# Patient Record
Sex: FEMALE | Race: WHITE | NOT HISPANIC OR LATINO | Employment: STUDENT | URBAN - METROPOLITAN AREA
[De-identification: names, ages, dates, MRNs, and addresses within clinical notes are randomized per-mention and may not be internally consistent; named-entity substitution may affect disease eponyms.]

---

## 2023-06-23 RX ORDER — FLUOXETINE HYDROCHLORIDE 40 MG/1
CAPSULE ORAL
Qty: 30 CAPSULE | Refills: 0 | OUTPATIENT
Start: 2023-06-23

## 2023-10-19 DIAGNOSIS — F32.A ANXIETY AND DEPRESSION: Primary | ICD-10-CM

## 2023-10-19 DIAGNOSIS — F41.9 ANXIETY AND DEPRESSION: Primary | ICD-10-CM

## 2023-10-20 RX ORDER — FLUOXETINE HYDROCHLORIDE 40 MG/1
40 CAPSULE ORAL DAILY
Qty: 30 CAPSULE | Refills: 0 | Status: SHIPPED | OUTPATIENT
Start: 2023-10-20 | End: 2023-12-08

## 2023-10-20 NOTE — TELEPHONE ENCOUNTER
I will send in 30 day refill.  She needs to schedule a telehealth visit (or in person if home) if she is going to request further refills from me.

## 2023-12-07 DIAGNOSIS — F32.A ANXIETY AND DEPRESSION: ICD-10-CM

## 2023-12-07 DIAGNOSIS — F41.9 ANXIETY AND DEPRESSION: ICD-10-CM

## 2023-12-08 DIAGNOSIS — F41.9 ANXIETY AND DEPRESSION: ICD-10-CM

## 2023-12-08 DIAGNOSIS — F32.A ANXIETY AND DEPRESSION: ICD-10-CM

## 2023-12-08 RX ORDER — FLUOXETINE HYDROCHLORIDE 40 MG/1
40 CAPSULE ORAL DAILY
Qty: 30 CAPSULE | Refills: 0 | Status: SHIPPED | OUTPATIENT
Start: 2023-12-08 | End: 2024-01-03 | Stop reason: SDUPTHER

## 2023-12-11 RX ORDER — FLUOXETINE HYDROCHLORIDE 40 MG/1
40 CAPSULE ORAL DAILY
Qty: 30 CAPSULE | Refills: 0 | OUTPATIENT
Start: 2023-12-11

## 2024-01-03 ENCOUNTER — OFFICE VISIT (OUTPATIENT)
Dept: PEDIATRICS | Facility: CLINIC | Age: 24
End: 2024-01-03
Payer: COMMERCIAL

## 2024-01-03 ENCOUNTER — LAB (OUTPATIENT)
Dept: LAB | Facility: LAB | Age: 24
End: 2024-01-03
Payer: COMMERCIAL

## 2024-01-03 VITALS
BODY MASS INDEX: 32.15 KG/M2 | WEIGHT: 193 LBS | DIASTOLIC BLOOD PRESSURE: 72 MMHG | SYSTOLIC BLOOD PRESSURE: 122 MMHG | HEIGHT: 65 IN | HEART RATE: 78 BPM

## 2024-01-03 DIAGNOSIS — Z00.00 ENCOUNTER FOR WELLNESS EXAMINATION IN ADULT: ICD-10-CM

## 2024-01-03 DIAGNOSIS — R53.83 OTHER FATIGUE: ICD-10-CM

## 2024-01-03 DIAGNOSIS — Z00.00 ENCOUNTER FOR WELLNESS EXAMINATION IN ADULT: Primary | ICD-10-CM

## 2024-01-03 DIAGNOSIS — K51.20 ULCERATIVE PROCTITIS WITHOUT COMPLICATION (MULTI): ICD-10-CM

## 2024-01-03 DIAGNOSIS — F41.9 ANXIETY AND DEPRESSION: ICD-10-CM

## 2024-01-03 DIAGNOSIS — F32.A ANXIETY AND DEPRESSION: ICD-10-CM

## 2024-01-03 DIAGNOSIS — E55.9 VITAMIN D DEFICIENCY: ICD-10-CM

## 2024-01-03 PROBLEM — L21.9 SEBORRHEIC DERMATITIS, UNSPECIFIED: Status: RESOLVED | Noted: 2023-03-13 | Resolved: 2024-01-03

## 2024-01-03 PROBLEM — L23.7 POISON IVY: Status: RESOLVED | Noted: 2024-01-03 | Resolved: 2024-01-03

## 2024-01-03 PROBLEM — H01.111 ALLERGIC DERMATITIS OF RIGHT UPPER EYELID: Status: ACTIVE | Noted: 2024-01-03

## 2024-01-03 PROBLEM — G44.209 TENSION HEADACHE: Status: ACTIVE | Noted: 2024-01-03

## 2024-01-03 PROBLEM — N92.6 IRREGULAR MENSTRUAL CYCLE: Status: RESOLVED | Noted: 2024-01-03 | Resolved: 2024-01-03

## 2024-01-03 PROBLEM — L21.8 OTHER SEBORRHEIC DERMATITIS: Status: RESOLVED | Noted: 2023-03-13 | Resolved: 2024-01-03

## 2024-01-03 PROBLEM — M26.629 TMJ PAIN DYSFUNCTION SYNDROME: Status: ACTIVE | Noted: 2024-01-03

## 2024-01-03 PROCEDURE — 85652 RBC SED RATE AUTOMATED: CPT

## 2024-01-03 PROCEDURE — 84443 ASSAY THYROID STIM HORMONE: CPT

## 2024-01-03 PROCEDURE — 83540 ASSAY OF IRON: CPT

## 2024-01-03 PROCEDURE — 83550 IRON BINDING TEST: CPT

## 2024-01-03 PROCEDURE — 82306 VITAMIN D 25 HYDROXY: CPT

## 2024-01-03 PROCEDURE — 90686 IIV4 VACC NO PRSV 0.5 ML IM: CPT | Performed by: PEDIATRICS

## 2024-01-03 PROCEDURE — 85025 COMPLETE CBC W/AUTO DIFF WBC: CPT

## 2024-01-03 PROCEDURE — 1036F TOBACCO NON-USER: CPT | Performed by: PEDIATRICS

## 2024-01-03 PROCEDURE — 99395 PREV VISIT EST AGE 18-39: CPT | Performed by: PEDIATRICS

## 2024-01-03 PROCEDURE — 90471 IMMUNIZATION ADMIN: CPT | Performed by: PEDIATRICS

## 2024-01-03 PROCEDURE — 36415 COLL VENOUS BLD VENIPUNCTURE: CPT

## 2024-01-03 PROCEDURE — 83718 ASSAY OF LIPOPROTEIN: CPT

## 2024-01-03 PROCEDURE — 82465 ASSAY BLD/SERUM CHOLESTEROL: CPT

## 2024-01-03 PROCEDURE — 80053 COMPREHEN METABOLIC PANEL: CPT

## 2024-01-03 RX ORDER — FLUOXETINE HYDROCHLORIDE 40 MG/1
40 CAPSULE ORAL DAILY
Qty: 90 CAPSULE | Refills: 1 | Status: SHIPPED | OUTPATIENT
Start: 2024-01-03 | End: 2024-07-01

## 2024-01-03 RX ORDER — FLUOXETINE HYDROCHLORIDE 40 MG/1
40 CAPSULE ORAL DAILY
Qty: 30 CAPSULE | Refills: 0 | Status: SHIPPED | OUTPATIENT
Start: 2024-01-03 | End: 2024-02-02

## 2024-01-03 RX ORDER — KETOCONAZOLE 20 MG/ML
SHAMPOO, SUSPENSION TOPICAL
COMMUNITY
Start: 2022-01-03

## 2024-01-03 RX ORDER — MESALAMINE 0.38 G/1
CAPSULE, EXTENDED RELEASE ORAL
COMMUNITY
End: 2024-01-16 | Stop reason: SDUPTHER

## 2024-01-03 RX ORDER — SELENIUM SULFIDE 2.5 MG/100ML
LOTION TOPICAL
COMMUNITY
Start: 2017-09-28 | End: 2024-03-13 | Stop reason: ALTCHOICE

## 2024-01-03 ASSESSMENT — VISUAL ACUITY
OS_CC: 20/20
OD_CC: 20/20

## 2024-01-03 NOTE — PROGRESS NOTES
Subjective   July is a 23 y.o. female who presents today for her Health Maintenance and Supervision Exam.    General Health:  July has concerns today about ongoing fatigue which makes self care more difficulty.  She struggles with healthy eating and regular sleep.  She tends to have insomnia and teeth grinding at night which contributes to tension headaches.  She has difficulty with motivation, but is also a perfectionist.  She has been seeing a therapist at college.  No thoughts of suicide.           Social and Family History:  At home, there have been no interval changes.  Living situation: lives independently on campus with other roommates.      Nutrition:  Balanced diet? Some of the time.  She sometimes doesn't have enough energy for meal prep.      Uses nutritional supplements? No    Dental Care:  July has a dental home? No--discussed finding a dentist to get fitted with a bite guard for TMJ.   Dental hygiene regularly performed? Yes    Elimination:  Elimination patterns appropriate: Occasional constipation or diarrhea.  She follows with GI for ulcerative proctitis.     Sleep:  Hours of sleep per night:  6 to 8 hrs  Sleep problems: Yes--insomnia  Snoring?  yes    Behavior/Socialization:  Good relationships with parents and siblings? Yes  Supportive adult relationship? Yes  Normal peer relationships? Yes       Development/Education:  July attends college in [place].     Academically well adjusted? Yes      Activities:    Physical Activity: Not regularly      Sexual History:  Dating? Yes.  Partner is non-binary    Menstrual History:  No menstrual periods since she has an IUD.     Drugs:  Tobacco? No  Uses drugs? alcohol--socially, but no binge drinking.     Mental Health:  Thoughts of self harm/suicide? No  She had not been taking medication regularly, but her mother has been motivating her to take it more consistently.           Objective   Physical Exam  Vitals reviewed.   Constitutional:       Appearance:  Normal appearance.   HENT:      Head: Normocephalic.      Right Ear: Tympanic membrane normal.      Left Ear: Tympanic membrane normal.      Nose: Nose normal.      Mouth/Throat:      Mouth: Mucous membranes are moist.      Pharynx: Oropharynx is clear.   Eyes:      Extraocular Movements: Extraocular movements intact.      Conjunctiva/sclera: Conjunctivae normal.      Pupils: Pupils are equal, round, and reactive to light.   Cardiovascular:      Rate and Rhythm: Normal rate and regular rhythm.      Heart sounds: No murmur heard.  Pulmonary:      Effort: Pulmonary effort is normal.      Breath sounds: Normal breath sounds.   Abdominal:      General: Abdomen is flat.      Palpations: Abdomen is soft.   Musculoskeletal:         General: Normal range of motion.      Cervical back: Normal range of motion and neck supple.   Skin:     General: Skin is warm and dry.   Neurological:      General: No focal deficit present.      Mental Status: She is alert.   Psychiatric:         Mood and Affect: Mood normal.         Assessment/Plan   Healthy 23 y.o. female child.  1. Anticipatory guidance discussed.  Safety topics reviewed.  2. Diagnoses and all orders for this visit:  Encounter for wellness examination in adult  -     Lipid Panel Non-Fasting; Future  Other fatigue  -     CBC and Auto Differential; Future  -     Sedimentation Rate; Future  -     Lipid Panel Non-Fasting; Future  -     TSH with reflex to Free T4 if abnormal; Future  -     Vitamin D 25-Hydroxy,Total (for eval of Vitamin D levels); Future  -     Comprehensive Metabolic Panel; Future  -     Iron and TIBC; Future  Ulcerative proctitis without complication (CMS/HCC)  -     CBC and Auto Differential; Future  -     Sedimentation Rate; Future  -     Vitamin D 25-Hydroxy,Total (for eval of Vitamin D levels); Future  -     Comprehensive Metabolic Panel; Future  -     Iron and TIBC; Future  Vitamin D deficiency  -     Vitamin D 25-Hydroxy,Total (for eval of Vitamin D  levels); Future  Anxiety and depression  -     FLUoxetine (PROzac) 40 mg capsule; Take 1 capsule (40 mg) by mouth once daily.  -     FLUoxetine (PROzac) 40 mg capsule; Take 1 capsule (40 mg) by mouth once daily.  Other orders  -     Flu vaccine (IIV4) age 6 months and greater, preservative free  -     Follow Up In Pediatrics; Future     3. Follow-up visit in 6 months for a medication check.

## 2024-01-03 NOTE — PATIENT INSTRUCTIONS
Here are some suggestions for living a healthy lifestyle and improving both your physical and mental health:     Try to eat at least 5 servings a day of fresh fruits and vegetables and limit processed and high sugar foods.      Get some exercise, or go for a walk for at least 30 minutes a day.  Spend less than 2 hours a day on a screen.  This doesn't count school work.  Make a list of activities that you enjoy that don't involve a screen such as reading, talking to friends, drawing, sports, crafts, etc.  Get at least 7-9 hours of regular sleep per night, and turn off electronics at least 30-60 minutes before bed.    Try new things, but remember you won't always succeed.  Talk about things that didn't go well and what you learned from the experience.  Remember, humans aren't perfect, so don't be hard on yourself--just keep trying and learning.  Try to make someone else smile every day.

## 2024-01-04 LAB
25(OH)D3 SERPL-MCNC: 27 NG/ML (ref 30–100)
ALBUMIN SERPL BCP-MCNC: 4.8 G/DL (ref 3.4–5)
ALP SERPL-CCNC: 98 U/L (ref 33–110)
ALT SERPL W P-5'-P-CCNC: 12 U/L (ref 7–45)
ANION GAP SERPL CALC-SCNC: 16 MMOL/L (ref 10–20)
AST SERPL W P-5'-P-CCNC: 14 U/L (ref 9–39)
BASOPHILS # BLD AUTO: 0.08 X10*3/UL (ref 0–0.1)
BASOPHILS NFR BLD AUTO: 1 %
BILIRUB SERPL-MCNC: 0.4 MG/DL (ref 0–1.2)
BUN SERPL-MCNC: 13 MG/DL (ref 6–23)
CALCIUM SERPL-MCNC: 10.1 MG/DL (ref 8.6–10.6)
CHLORIDE SERPL-SCNC: 103 MMOL/L (ref 98–107)
CHOLEST SERPL-MCNC: 175 MG/DL (ref 0–199)
CHOLESTEROL/HDL RATIO: 3.9
CO2 SERPL-SCNC: 24 MMOL/L (ref 21–32)
CREAT SERPL-MCNC: 0.72 MG/DL (ref 0.5–1.05)
EOSINOPHIL # BLD AUTO: 0.1 X10*3/UL (ref 0–0.7)
EOSINOPHIL NFR BLD AUTO: 1.2 %
ERYTHROCYTE [DISTWIDTH] IN BLOOD BY AUTOMATED COUNT: 14.6 % (ref 11.5–14.5)
ERYTHROCYTE [SEDIMENTATION RATE] IN BLOOD BY WESTERGREN METHOD: 10 MM/H (ref 0–20)
GFR SERPL CREATININE-BSD FRML MDRD: >90 ML/MIN/1.73M*2
GLUCOSE SERPL-MCNC: 111 MG/DL (ref 74–99)
HCT VFR BLD AUTO: 42.4 % (ref 36–46)
HDLC SERPL-MCNC: 45.2 MG/DL
HGB BLD-MCNC: 13.5 G/DL (ref 12–16)
IMM GRANULOCYTES # BLD AUTO: 0.03 X10*3/UL (ref 0–0.7)
IMM GRANULOCYTES NFR BLD AUTO: 0.4 % (ref 0–0.9)
IRON SATN MFR SERPL: 21 % (ref 25–45)
IRON SERPL-MCNC: 92 UG/DL (ref 35–150)
LYMPHOCYTES # BLD AUTO: 1.89 X10*3/UL (ref 1.2–4.8)
LYMPHOCYTES NFR BLD AUTO: 22.9 %
MCH RBC QN AUTO: 25.9 PG (ref 26–34)
MCHC RBC AUTO-ENTMCNC: 31.8 G/DL (ref 32–36)
MCV RBC AUTO: 81 FL (ref 80–100)
MONOCYTES # BLD AUTO: 0.64 X10*3/UL (ref 0.1–1)
MONOCYTES NFR BLD AUTO: 7.8 %
NEUTROPHILS # BLD AUTO: 5.5 X10*3/UL (ref 1.2–7.7)
NEUTROPHILS NFR BLD AUTO: 66.7 %
NON-HDL CHOLESTEROL: 130 MG/DL (ref 0–149)
NRBC BLD-RTO: 0 /100 WBCS (ref 0–0)
PLATELET # BLD AUTO: 375 X10*3/UL (ref 150–450)
POTASSIUM SERPL-SCNC: 3.9 MMOL/L (ref 3.5–5.3)
PROT SERPL-MCNC: 7.4 G/DL (ref 6.4–8.2)
RBC # BLD AUTO: 5.21 X10*6/UL (ref 4–5.2)
SODIUM SERPL-SCNC: 139 MMOL/L (ref 136–145)
TIBC SERPL-MCNC: 438 UG/DL (ref 240–445)
TSH SERPL-ACNC: 3.77 MIU/L (ref 0.44–3.98)
UIBC SERPL-MCNC: 346 UG/DL (ref 110–370)
WBC # BLD AUTO: 8.2 X10*3/UL (ref 4.4–11.3)

## 2024-01-16 DIAGNOSIS — K51.311 ULCERATIVE RECTOSIGMOIDITIS WITH RECTAL BLEEDING (MULTI): Primary | ICD-10-CM

## 2024-01-16 RX ORDER — MESALAMINE 0.38 G/1
375 CAPSULE, EXTENDED RELEASE ORAL 4 TIMES DAILY
Qty: 360 CAPSULE | Refills: 3 | Status: SHIPPED | OUTPATIENT
Start: 2024-01-16 | End: 2025-01-15

## 2024-03-13 ENCOUNTER — OFFICE VISIT (OUTPATIENT)
Dept: DERMATOLOGY | Facility: CLINIC | Age: 24
End: 2024-03-13
Payer: COMMERCIAL

## 2024-03-13 DIAGNOSIS — D22.9 BENIGN NEVUS: ICD-10-CM

## 2024-03-13 DIAGNOSIS — L21.9 SEBORRHEIC DERMATITIS: Primary | ICD-10-CM

## 2024-03-13 DIAGNOSIS — L70.0 ACNE VULGARIS: ICD-10-CM

## 2024-03-13 PROCEDURE — 99214 OFFICE O/P EST MOD 30 MIN: CPT | Performed by: DERMATOLOGY

## 2024-03-13 PROCEDURE — 1036F TOBACCO NON-USER: CPT | Performed by: DERMATOLOGY

## 2024-03-13 RX ORDER — TRETINOIN 0.5 MG/G
CREAM TOPICAL
Qty: 45 G | Refills: 1 | Status: SHIPPED | OUTPATIENT
Start: 2024-03-13

## 2024-03-13 RX ORDER — KETOCONAZOLE 20 MG/ML
SHAMPOO, SUSPENSION TOPICAL
Qty: 120 ML | Refills: 11 | Status: SHIPPED | OUTPATIENT
Start: 2024-03-13

## 2024-03-13 NOTE — PROGRESS NOTES
Subjective     July Harris is a 23 y.o. female who presents for the following: Seborrheic Dermatitis (Pt is using ketoconazole shampoo with good response, requesting refills. ), Acne (Face- pt states has only used otc treatments. ), and Suspicious Skin Lesion (Left shoulder lesion ).     Review of Systems:  No other skin or systemic complaints other than what is documented elsewhere in the note.    The following portions of the chart were reviewed this encounter and updated as appropriate:   Tobacco  Allergies  Meds  Problems  Med Hx  Surg Hx  Fam Hx         Skin Cancer History  No skin cancer on file.      Specialty Problems    None       Objective   Well appearing patient in no apparent distress; mood and affect are within normal limits.    A focused skin examination was performed. All findings within normal limits unless otherwise noted below.    Assessment/Plan   1. Seborrheic dermatitis  Scalp  Erythematous macule(s)/patch(es) with greasy scale    -Recommend to continue ketoconazole shampoo; refills sent today  -Topical clobetasol solution ineffective for the patient    ketoconazole (NIZOral) 2 % shampoo - Scalp  Lather on to affected area. Let sit for 2 minutes. Rinse thoroughly. Use 4 times per week.    2. Acne vulgaris  Head - Anterior (Face)  Open/closed comedones    -Discussed the nature of the condition  -Discussed treatment options  -Recommend to begin topical retinoid tretinoin 0.05%; if just starting therapy with retinoid, start application of a very thin layer three nights per week as tolerated. If extreme redness or dryness occurs, hold medication until resolved and then restart at a greater interval. May apply moisturizer after application of retinoid. Advance toward nightly use as tolerated  -Recommend:    tretinoin (Retin-A) 0.05 % cream - Head - Anterior (Face)  Apply a thin layer to affected areas at bedtime as tolerated.    3. Benign nevus  Left Shoulder - Posterior  Uniform pigmented  macule(s)/papule(s) with reassuring findings on dermoscopy    -Discussed nature of condition  -Reassurance, benign-appearing features on examination today  -Recommend continued observation        Follow up in 4 month for acne  Discussed if there are any changes or development of concerning symptoms (lesion/skin condition is changing, bleeding, enlarging, or worsening) the patient is to contact my office. The patient verbalizes understanding.    Clau Echols MD  3/13/2024    Addendum: Received communication from [employer]ito that Zoryve is approved.    Clau Echols MD  12/10/24

## 2024-06-18 DIAGNOSIS — K51.311 ULCERATIVE RECTOSIGMOIDITIS WITH RECTAL BLEEDING (MULTI): ICD-10-CM

## 2024-06-18 RX ORDER — MESALAMINE 0.38 G/1
375 CAPSULE, EXTENDED RELEASE ORAL 4 TIMES DAILY
Qty: 360 CAPSULE | Refills: 3 | Status: SHIPPED | OUTPATIENT
Start: 2024-06-18 | End: 2025-06-18

## 2024-07-15 ENCOUNTER — APPOINTMENT (OUTPATIENT)
Dept: DERMATOLOGY | Facility: CLINIC | Age: 24
End: 2024-07-15
Payer: COMMERCIAL

## 2024-07-24 DIAGNOSIS — F41.9 ANXIETY AND DEPRESSION: ICD-10-CM

## 2024-07-24 DIAGNOSIS — F32.A ANXIETY AND DEPRESSION: ICD-10-CM

## 2024-07-24 RX ORDER — FLUOXETINE HYDROCHLORIDE 40 MG/1
40 CAPSULE ORAL DAILY
Qty: 90 CAPSULE | Refills: 1 | Status: SHIPPED | OUTPATIENT
Start: 2024-07-24 | End: 2024-07-26 | Stop reason: WASHOUT

## 2024-07-26 ENCOUNTER — APPOINTMENT (OUTPATIENT)
Dept: PEDIATRICS | Facility: CLINIC | Age: 24
End: 2024-07-26
Payer: COMMERCIAL

## 2024-07-26 DIAGNOSIS — F32.A ANXIETY AND DEPRESSION: ICD-10-CM

## 2024-07-26 DIAGNOSIS — F41.9 ANXIETY AND DEPRESSION: ICD-10-CM

## 2024-07-26 PROCEDURE — 99213 OFFICE O/P EST LOW 20 MIN: CPT | Performed by: PEDIATRICS

## 2024-07-26 RX ORDER — FLUOXETINE HYDROCHLORIDE 40 MG/1
40 CAPSULE ORAL DAILY
Qty: 90 CAPSULE | Refills: 1 | Status: SHIPPED | OUTPATIENT
Start: 2024-10-14

## 2024-07-26 RX ORDER — FLUOXETINE HYDROCHLORIDE 40 MG/1
40 CAPSULE ORAL DAILY
Qty: 90 CAPSULE | Refills: 0 | Status: SHIPPED | OUTPATIENT
Start: 2024-07-26

## 2024-07-26 NOTE — PROGRESS NOTES
Subjective   July Harris is a 24 y.o. adult who presents for follow up of anxiety disorder.   This visit was done virtually.      Since the last visit:  She has been doing well and continues to see a therapist.  She feels that her mood has been more stable.  She still has some fatigue, but is in a better routine, and has been managing self care better.      July complains of the following side effects from the treatment: none.     Headache:  Yes --usually due to increased tension and teeth clenching.   Abdominal pain:  No   Dizziness:  No   Appetite changes:  No   Sleep disturbance:  No     Panic attacks:  rarely  Motivation:  good  Mood:  stable  Activities/friendships:  supportive  Relationships/Dating:  she is in a stable relationship.   Alcohol use:  in moderation, socially.   Other drug use:  denies.           Symptoms are:  better  Current suicidal risk:  None Reported    Objective   There were no vitals taken for this visit.   Physical Exam  Constitutional:       General: July is not in acute distress.     Appearance: Normal appearance.   Neurological:      Mental Status: July is alert.   Psychiatric:         Mood and Affect: Mood normal.         Thought Content: Thought content normal.         Assessment/Plan   Anxiety Disorder - improving  Diagnoses and all orders for this visit:  Anxiety and depression  -     FLUoxetine (PROzac) 40 mg capsule; Take 1 capsule (40 mg) by mouth once daily.  -     FLUoxetine (PROzac) 40 mg capsule; Take 1 capsule (40 mg) by mouth once daily. Do not fill before October 14, 2024.  Continue the current dose of fluoxetine.  Continue counseling.  Follow up in 6 months.  Will discuss transition to adult care as she will be graduating from college after next semester.

## 2024-09-25 DIAGNOSIS — K51.311 ULCERATIVE RECTOSIGMOIDITIS WITH RECTAL BLEEDING (MULTI): ICD-10-CM

## 2024-09-25 RX ORDER — MESALAMINE 0.38 G/1
375 CAPSULE, EXTENDED RELEASE ORAL 4 TIMES DAILY
Qty: 360 CAPSULE | Refills: 3 | Status: SHIPPED | OUTPATIENT
Start: 2024-09-25 | End: 2025-09-25

## 2024-11-26 ENCOUNTER — APPOINTMENT (OUTPATIENT)
Dept: DERMATOLOGY | Facility: CLINIC | Age: 24
End: 2024-11-26
Payer: COMMERCIAL

## 2024-11-26 DIAGNOSIS — L70.0 ACNE VULGARIS: ICD-10-CM

## 2024-11-26 DIAGNOSIS — L21.9 SEBORRHEIC DERMATITIS: Primary | ICD-10-CM

## 2024-11-26 PROCEDURE — 1036F TOBACCO NON-USER: CPT | Performed by: DERMATOLOGY

## 2024-11-26 PROCEDURE — 99214 OFFICE O/P EST MOD 30 MIN: CPT | Performed by: DERMATOLOGY

## 2024-11-26 RX ORDER — ROFLUMILAST 3 MG/G
CREAM TOPICAL DAILY
Status: CANCELLED | OUTPATIENT
Start: 2024-11-26

## 2024-11-26 RX ORDER — CLOBETASOL PROPIONATE 0.5 MG/ML
SOLUTION TOPICAL
Qty: 50 ML | Refills: 0 | Status: CANCELLED | OUTPATIENT
Start: 2024-11-26

## 2024-11-26 RX ORDER — CICLOPIROX 1 G/100ML
SHAMPOO TOPICAL
Qty: 120 ML | Refills: 2 | Status: CANCELLED | OUTPATIENT
Start: 2024-11-26

## 2024-11-26 RX ORDER — FLUOCINOLONE ACETONIDE 0.11 MG/ML
OIL TOPICAL
Qty: 118.28 ML | Refills: 0 | Status: CANCELLED | OUTPATIENT
Start: 2024-11-26

## 2024-11-26 NOTE — PROGRESS NOTES
Subjective     July Harris is a 24 y.o. adult who presents for the following: Acne (Pt states using tretinoin 0.05% prn with good response. Pt states improving. ).     Review of Systems:  No other skin or systemic complaints other than what is documented elsewhere in the note.    The following portions of the chart were reviewed this encounter and updated as appropriate:   Tobacco  Allergies  Meds  Problems  Med Hx  Surg Hx  Fam Hx                  Objective   Well appearing patient in no apparent distress; mood and affect are within normal limits.    A focused skin examination was performed. All findings within normal limits unless otherwise noted below.    Assessment/Plan   1. Seborrheic dermatitis  Scalp  Erythematous macule(s)/patch(es) with greasy scale    Inadequately controlled  -Recommend to continue ketoconazole shampoo  -Topical clobetasol solution ineffective for the patient; topical dermasmooth scalp oil ineffective  -Start lidex solution to the scalp twice daily when active  -Trial Zoryve; rx sent to [facility] pharmacy today    Related Medications  ketoconazole (NIZOral) 2 % shampoo  Lather on to affected area. Let sit for 2 minutes. Rinse thoroughly. Use 4 times per week.    roflumilast 0.3 % cream  Apply 1 Application topically once daily.    2. Acne vulgaris  Head - Anterior (Face)  A few scattered comedones    Patient is satisfied with level of control using topical tretinoin 0.05% prn  -Recommend to continue  -Patient declines use of medications daily; gets mild breakout 1-2 times per month    Related Medications  tretinoin (Retin-A) 0.05 % cream  Apply a thin layer to affected areas at bedtime as tolerated.        Follow up in 3-4 months seb derm  Discussed if there are any changes or development of concerning symptoms (lesion/skin condition is changing, bleeding, enlarging, or worsening) the patient is to contact my office. The patient verbalizes understanding.    Clau Orozco  MD [facility]  11/26/2024

## 2025-02-27 DIAGNOSIS — K51.20 ULCERATIVE PROCTITIS WITHOUT COMPLICATION (MULTI): Primary | ICD-10-CM

## 2025-02-27 RX ORDER — MESALAMINE 1000 MG/1
1000 SUPPOSITORY RECTAL NIGHTLY
Qty: 30 SUPPOSITORY | Refills: 11 | Status: SHIPPED | OUTPATIENT
Start: 2025-02-27 | End: 2026-02-27

## 2025-03-12 ENCOUNTER — OFFICE VISIT (OUTPATIENT)
Dept: GASTROENTEROLOGY | Facility: CLINIC | Age: 25
End: 2025-03-12
Payer: COMMERCIAL

## 2025-03-12 VITALS
BODY MASS INDEX: 30.82 KG/M2 | HEIGHT: 65 IN | SYSTOLIC BLOOD PRESSURE: 97 MMHG | WEIGHT: 185 LBS | DIASTOLIC BLOOD PRESSURE: 65 MMHG | HEART RATE: 98 BPM | TEMPERATURE: 97.9 F

## 2025-03-12 DIAGNOSIS — K51.311 ULCERATIVE RECTOSIGMOIDITIS WITH RECTAL BLEEDING (MULTI): ICD-10-CM

## 2025-03-12 PROCEDURE — 3008F BODY MASS INDEX DOCD: CPT | Performed by: INTERNAL MEDICINE

## 2025-03-12 PROCEDURE — 99214 OFFICE O/P EST MOD 30 MIN: CPT | Performed by: INTERNAL MEDICINE

## 2025-03-12 PROCEDURE — 1036F TOBACCO NON-USER: CPT | Performed by: INTERNAL MEDICINE

## 2025-03-12 RX ORDER — MESALAMINE 0.38 G/1
1.5 CAPSULE, EXTENDED RELEASE ORAL 2 TIMES DAILY
Qty: 720 CAPSULE | Refills: 3 | Status: SHIPPED | OUTPATIENT
Start: 2025-03-12 | End: 2026-03-12

## 2025-03-12 RX ORDER — PREDNISONE 5 MG/1
TABLET ORAL
Qty: 252 TABLET | Refills: 0 | Status: SHIPPED | OUTPATIENT
Start: 2025-03-12 | End: 2025-05-07

## 2025-03-12 ASSESSMENT — PAIN SCALES - GENERAL: PAINLEVEL_OUTOF10: 2

## 2025-03-12 NOTE — PROGRESS NOTES
History Of Present Illness  July Harris is a 24 y.o. adult presenting with ulcerative colitis.    They were diagnosed in 2019 when she presented with rectal bleeding and was found on colonoscopy to have ulcerative proctitis.  This was confirmed on biopsy.  Random biopsies from the right and left colon were normal and the terminal ileum appeared normal.  At that time they were treated with oral mesalamine which they had been doing well on until November 2024.  At that time they began to experience more frequent stools up to 4 times daily with occasional nocturnal diarrhea.  There was blood both separate and mixed in with the stool as well as urgency and tenesmus.  There is associated abdominal cramping as well as low back pain.  Weight has been relatively stable.  They were started on topical mesalamine about 3 weeks ago with only modest improvement.  There is no recent blood work done.  There was no antecedent illness prior to the November flare.  There is a family history of ulcerative colitis in their mother.     Past Medical History  Past Medical History:   Diagnosis Date    Influenza due to other identified influenza virus with other respiratory manifestations 03/20/2017    Influenza B    Irregular menstrual cycle 01/03/2024    Local infection of the skin and subcutaneous tissue, unspecified 07/19/2016    Bacterial skin infection    Other conditions influencing health status 07/31/2019    History of chronic diarrhea    Other seborrheic dermatitis 03/13/2023    Personal history of other diseases of the digestive system 07/31/2019    History of constipation    Personal history of other diseases of the female genital tract 08/26/2015    History of dysmenorrhea    Personal history of other diseases of the respiratory system 05/08/2015    History of streptococcal pharyngitis    Personal history of other diseases of the respiratory system 11/02/2016    History of pharyngitis    Personal history of other diseases of  "the respiratory system 11/02/2016    History of streptococcal pharyngitis    Personal history of other specified conditions 03/20/2017    History of fever    Poison ivy 01/03/2024    Seborrheic dermatitis, unspecified 01/02/2019    Seborrheic dermatitis of scalp    Seborrheic dermatitis, unspecified 03/13/2023       Surgical History  No past surgical history on file.     Social History  July reports that July has never smoked. July has never used smokeless tobacco. July reports current alcohol use of about 3.0 standard drinks of alcohol per week. July reports that July does not use drugs.    Family History  No family history on file.     Allergies  Sulfa (sulfonamide antibiotics)    Last Recorded Vitals  BP 97/65   Pulse 98   Temp 36.6 °C (97.9 °F) (Temporal)   Ht 1.651 m (5' 5\")   Wt 83.9 kg (185 lb)   BMI 30.79 kg/m²        Physical Exam  Vitals reviewed.   Constitutional:       Appearance: Normal appearance.   Cardiovascular:      Rate and Rhythm: Normal rate and regular rhythm.   Pulmonary:      Effort: Pulmonary effort is normal.      Breath sounds: Normal breath sounds.   Abdominal:      General: Bowel sounds are normal.      Palpations: Abdomen is soft. There is no mass.      Tenderness: There is no abdominal tenderness.   Genitourinary:     Comments: Perianal sinus tract, nontender, nonbleeding  Neurological:      Mental Status: July is alert.           Labs  Lab Results   Component Value Date    GLUCOSE 111 (H) 01/03/2024    CALCIUM 10.1 01/03/2024     01/03/2024    K 3.9 01/03/2024    CO2 24 01/03/2024     01/03/2024    BUN 13 01/03/2024    CREATININE 0.72 01/03/2024     Lab Results   Component Value Date    WBC 8.2 01/03/2024    HGB 13.5 01/03/2024    HCT 42.4 01/03/2024    MCV 81 01/03/2024     01/03/2024     01/03/2024    K 3.9 01/03/2024     01/03/2024    CO2 24 01/03/2024    BUN 13 01/03/2024    CREATININE 0.72 01/03/2024    CALCIUM 10.1 01/03/2024    PROT " 7.4 01/03/2024    BILITOT 0.4 01/03/2024    ALKPHOS 98 01/03/2024    ALT 12 01/03/2024    AST 14 01/03/2024    GLUCOSE 111 (H) 01/03/2024    CRP 0.73 07/31/2019           Imaging     Patient was never admitted.  Colonoscopy  Patient Name: July Harris  Procedure Date: 8/28/2019 9:14 AM  MRN: [id number]  Account Number: [id number]  YOB: 2000  Site: [facility] Procedure Room 1  Ethnicity: Not  or   Race: White  Attending MD: Yakov Todd MD, [id number]  Procedure:             Colonoscopy  Indications:           Hematochezia, Family history of inflammatory bowel                          disease in a first-degree relative  Patient Profile:       This is a 19 year old female.  Providers:             Yakov Todd MD (Doctor) Gastroenterology  Referring MD:          Stephanie Farah  Medicines:             Demerol  mgs, Versed IV 5 mgs  Complications:         No immediate complications.  Procedure:             Pre-Anesthesia Assessment:                         - Prior to the procedure, a History and Physical was                          performed, and patient medications and allergies were                          reviewed. The patient is competent. The risks and                          benefits of the procedure and the sedation options and                          risks were discussed with the patient. All questions                          were answered and informed consent was obtained.                          Patient identification and proposed procedure were                          verified by the physician and the nurse in the                          pre-procedure area. Mental Status Examination: alert                          and oriented. Airway Examination: normal oropharyngeal                          airway and neck mobility. Respiratory Examination:                          clear to auscultation. CV Examination: normal.                          Prophylactic  Antibiotics: The patient does not require                          prophylactic antibiotics. Prior Anticoagulants: The                          patient has taken no previous anticoagulant or                          antiplatelet agents. ASA Grade Assessment: I - A                          normal, healthy patient. After reviewing the risks and                          benefits, the patient was deemed in satisfactory                          condition to undergo the procedure. The anesthesia                          plan was to use moderate sedation / analgesia                          (conscious sedation). Immediately prior to                          administration of medications, the patient was                          re-assessed for adequacy to receive sedatives. The                          heart rate, respiratory rate, oxygen saturations,                          blood pressure, adequacy of pulmonary ventilation, and                          response to care were monitored throughout the                          procedure. The physical status of the patient was                          re-assessed after the procedure.                         After I obtained informed consent, the scope was                          passed under direct vision. Throughout the procedure,                          the patient's blood pressure, pulse, and oxygen                          saturations were monitored continuously. The pediatric                          colonoscope was introduced through the anus and                          advanced to the terminal ileum, with identification of                          the appendiceal orifice and IC valve. The terminal                          ileum, ileocecal valve, appendiceal orifice, and                          rectum were photographed. The colonoscopy was                          performed without difficulty. The patient tolerated                          the procedure well. The  quality of the bowel                          preparation was evaluated using the BBPS (Wilbur Bowel                          Preparation Scale) with scores of: Right Colon = 3                          (entire mucosa seen well with no residual staining,                          small fragments of stool or opaque liquid), Transverse                          Colon = 3 (entire mucosa seen well with no residual                          staining, small fragments of stool or opaque liquid)                          and Left Colon = 3 (entire mucosa seen well with no                          residual staining, small fragments of stool or opaque                          liquid). The total BBPS score equals 9. The quality of                          the bowel preparation was excellent.  Findings:       The perianal and digital rectal examinations were normal.       A continuous area of nonbleeding granular, ulcerated mucosa was present        in the rectum. Biopsies were taken with a cold forceps for histology.       The exam was otherwise normal throughout the examined colon.       Biopsies were taken with a cold forceps in the sigmoid colon and in the        cecum for histology.       The terminal ileum appeared normal.  Moderate Sedation:       Moderate (conscious) sedation was administered by the endoscopy nurse        and supervised by the endoscopist. The following parameters were        monitored: oxygen saturation, heart rate, blood pressure, respiratory        rate, EKG, adequacy of pulmonary ventilation, and response to care.        Total physician intraservice time was 36 minutes.  Estimated Blood Loss:       Estimated blood loss: none.  Impression:            - Mucosal ulceration. Biopsied.                         - The examined portion of the ileum was normal.                         - Biopsies were taken with a cold forceps for                          histology in the sigmoid colon and in the  cecum.  Recommendation:        - Patient has a contact number available for                          emergencies. The signs and symptoms of potential                          delayed complications were discussed with the patient.                          Return to normal activities tomorrow. Written                          discharge instructions were provided to the patient.                         - Resume previous diet.                         - Continue present medications.                         - Await pathology results.                         - Repeat colonoscopy date to be determined after                          pending pathology results are reviewed for                          surveillance.                         - Return to GI office in 4 weeks.  Attending Participation:       I personally performed the entire procedure.  Yakov Todd MD  8/28/2019 10:00:26 AM  This report has been signed electronically.  Number of Addenda: 0  Note Initiated On: 8/28/2019 9:14 AM  Scope Withdrawal Time 0 hours 15 minutes 40 seconds   Total Procedure Duration Time 0 hours 27 minutes 43 seconds          ASSESSMENT & PLAN  Problem List Items Addressed This Visit    None  Visit Diagnoses         Codes    Ulcerative rectosigmoiditis with rectal bleeding (Multi)     K51.311    Relevant Medications    mesalamine ER (Apriso) 0.375 gram 24 hr capsule    predniSONE (Deltasone) 5 mg tablet    Other Relevant Orders    CBC    C-reactive protein    Comprehensive metabolic panel    Colonoscopy Screening; High Risk Patient          This is almost certainly a ulcerative colitis flare and at this point cannot tell whether the colitis has progressed proximally or not.  We will increase the mesalamine dose to 3 g daily and continue topical therapy.  We will give a prednisone taper starting at 40 mg daily.  Labs will be sent for CBC CRP and comprehensive panel.  Colonoscopy will be scheduled for later this spring after [facility]  graduation.    I spent 20 minutes in the professional and overall care of this patient.      Yakov Todd MD

## 2025-03-12 NOTE — PATIENT INSTRUCTIONS
We will increase the dose of oral mesalamine and give you a several week taper of prednisone. I will check blood work today and schedule for a colonoscopy in May to recheck the colitis. Let me know via My Chart if anything changes.

## 2025-03-13 LAB
ALBUMIN SERPL-MCNC: 4.5 G/DL (ref 3.6–5.1)
ALP SERPL-CCNC: 75 U/L (ref 31–125)
ALT SERPL-CCNC: 10 U/L (ref 6–29)
ANION GAP SERPL CALCULATED.4IONS-SCNC: 8 MMOL/L (CALC) (ref 7–17)
AST SERPL-CCNC: 11 U/L (ref 10–30)
BILIRUB SERPL-MCNC: 0.3 MG/DL (ref 0.2–1.2)
BUN SERPL-MCNC: 15 MG/DL (ref 7–25)
CALCIUM SERPL-MCNC: 9.5 MG/DL (ref 8.6–10.2)
CHLORIDE SERPL-SCNC: 107 MMOL/L (ref 98–110)
CO2 SERPL-SCNC: 28 MMOL/L (ref 20–32)
CREAT SERPL-MCNC: 0.88 MG/DL (ref 0.5–0.96)
CRP SERPL-MCNC: 9.1 MG/L
EGFRCR SERPLBLD CKD-EPI 2021: 95 ML/MIN/1.73M2
ERYTHROCYTE [DISTWIDTH] IN BLOOD BY AUTOMATED COUNT: 12.1 % (ref 11–15)
GLUCOSE SERPL-MCNC: 103 MG/DL (ref 65–139)
HCT VFR BLD AUTO: 36.4 % (ref 35–45)
HGB BLD-MCNC: 11.9 G/DL (ref 11.7–15.5)
MCH RBC QN AUTO: 29.5 PG (ref 27–33)
MCHC RBC AUTO-ENTMCNC: 32.7 G/DL (ref 32–36)
MCV RBC AUTO: 90.1 FL (ref 80–100)
PLATELET # BLD AUTO: 466 THOUSAND/UL (ref 140–400)
PMV BLD REES-ECKER: 9.7 FL (ref 7.5–12.5)
POTASSIUM SERPL-SCNC: 4.4 MMOL/L (ref 3.5–5.3)
PROT SERPL-MCNC: 7.1 G/DL (ref 6.1–8.1)
RBC # BLD AUTO: 4.04 MILLION/UL (ref 3.8–5.1)
SODIUM SERPL-SCNC: 143 MMOL/L (ref 135–146)
WBC # BLD AUTO: 13 THOUSAND/UL (ref 3.8–10.8)

## 2025-03-17 ENCOUNTER — APPOINTMENT (OUTPATIENT)
Dept: DERMATOLOGY | Facility: CLINIC | Age: 25
End: 2025-03-17
Payer: COMMERCIAL

## 2025-04-04 DIAGNOSIS — F41.9 ANXIETY AND DEPRESSION: ICD-10-CM

## 2025-04-04 DIAGNOSIS — F32.A ANXIETY AND DEPRESSION: ICD-10-CM

## 2025-04-07 RX ORDER — FLUOXETINE HYDROCHLORIDE 40 MG/1
40 CAPSULE ORAL DAILY
Qty: 90 CAPSULE | Refills: 1 | Status: SHIPPED | OUTPATIENT
Start: 2025-04-07

## 2025-04-29 DIAGNOSIS — K51.311 ULCERATIVE RECTOSIGMOIDITIS WITH RECTAL BLEEDING (MULTI): ICD-10-CM

## 2025-04-29 RX ORDER — PREDNISONE 5 MG/1
TABLET ORAL
Qty: 252 TABLET | Refills: 0 | Status: SHIPPED | OUTPATIENT
Start: 2025-04-29

## 2025-05-16 DIAGNOSIS — K51.311 ULCERATIVE RECTOSIGMOIDITIS WITH RECTAL BLEEDING (MULTI): Primary | ICD-10-CM

## 2025-05-16 RX ORDER — PREDNISONE 5 MG/1
5 TABLET ORAL 2 TIMES DAILY
Qty: 60 TABLET | Refills: 2 | Status: SHIPPED | OUTPATIENT
Start: 2025-05-16 | End: 2025-06-15

## 2025-05-23 ENCOUNTER — APPOINTMENT (OUTPATIENT)
Dept: PEDIATRICS | Facility: CLINIC | Age: 25
End: 2025-05-23
Payer: COMMERCIAL

## 2025-05-23 VITALS
WEIGHT: 195.8 LBS | SYSTOLIC BLOOD PRESSURE: 118 MMHG | HEART RATE: 72 BPM | DIASTOLIC BLOOD PRESSURE: 74 MMHG | BODY MASS INDEX: 32.62 KG/M2 | HEIGHT: 65 IN

## 2025-05-23 DIAGNOSIS — Z23 ENCOUNTER FOR IMMUNIZATION: ICD-10-CM

## 2025-05-23 DIAGNOSIS — F32.A ANXIETY AND DEPRESSION: ICD-10-CM

## 2025-05-23 DIAGNOSIS — F41.9 ANXIETY AND DEPRESSION: ICD-10-CM

## 2025-05-23 DIAGNOSIS — K51.219 CHRONIC ULCERATIVE PROCTITIS WITH COMPLICATION (MULTI): ICD-10-CM

## 2025-05-23 DIAGNOSIS — Z00.00 WELL EXAM WITHOUT ABNORMAL FINDINGS OF PATIENT 18 YEARS OF AGE OR OLDER: Primary | ICD-10-CM

## 2025-05-23 PROCEDURE — 90471 IMMUNIZATION ADMIN: CPT | Performed by: PEDIATRICS

## 2025-05-23 PROCEDURE — 99395 PREV VISIT EST AGE 18-39: CPT | Performed by: PEDIATRICS

## 2025-05-23 PROCEDURE — 99213 OFFICE O/P EST LOW 20 MIN: CPT | Performed by: PEDIATRICS

## 2025-05-23 PROCEDURE — 1036F TOBACCO NON-USER: CPT | Performed by: PEDIATRICS

## 2025-05-23 PROCEDURE — 3008F BODY MASS INDEX DOCD: CPT | Performed by: PEDIATRICS

## 2025-05-23 PROCEDURE — 90715 TDAP VACCINE 7 YRS/> IM: CPT | Performed by: PEDIATRICS

## 2025-05-23 PROCEDURE — 96127 BRIEF EMOTIONAL/BEHAV ASSMT: CPT | Performed by: PEDIATRICS

## 2025-05-23 RX ORDER — FLUOXETINE HYDROCHLORIDE 40 MG/1
40 CAPSULE ORAL DAILY
Qty: 90 CAPSULE | Refills: 1 | Status: SHIPPED | OUTPATIENT
Start: 2025-05-23

## 2025-05-23 ASSESSMENT — PATIENT HEALTH QUESTIONNAIRE - PHQ9
1. LITTLE INTEREST OR PLEASURE IN DOING THINGS: SEVERAL DAYS
10. IF YOU CHECKED OFF ANY PROBLEMS, HOW DIFFICULT HAVE THESE PROBLEMS MADE IT FOR YOU TO DO YOUR WORK, TAKE CARE OF THINGS AT HOME, OR GET ALONG WITH OTHER PEOPLE: EXTREMELY DIFFICULT
7. TROUBLE CONCENTRATING ON THINGS, SUCH AS READING THE NEWSPAPER OR WATCHING TELEVISION: SEVERAL DAYS
6. FEELING BAD ABOUT YOURSELF - OR THAT YOU ARE A FAILURE OR HAVE LET YOURSELF OR YOUR FAMILY DOWN: MORE THAN HALF THE DAYS
SUM OF ALL RESPONSES TO PHQ QUESTIONS 1-9: 13
8. MOVING OR SPEAKING SO SLOWLY THAT OTHER PEOPLE COULD HAVE NOTICED. OR THE OPPOSITE, BEING SO FIGETY OR RESTLESS THAT YOU HAVE BEEN MOVING AROUND A LOT MORE THAN USUAL: NOT AT ALL
2. FEELING DOWN, DEPRESSED OR HOPELESS: MORE THAN HALF THE DAYS
10. IF YOU CHECKED OFF ANY PROBLEMS, HOW DIFFICULT HAVE THESE PROBLEMS MADE IT FOR YOU TO DO YOUR WORK, TAKE CARE OF THINGS AT HOME, OR GET ALONG WITH OTHER PEOPLE: EXTREMELY DIFFICULT
1. LITTLE INTEREST OR PLEASURE IN DOING THINGS: SEVERAL DAYS
9. THOUGHTS THAT YOU WOULD BE BETTER OFF DEAD, OR OF HURTING YOURSELF: SEVERAL DAYS
SUM OF ALL RESPONSES TO PHQ9 QUESTIONS 1 & 2: 3
9. THOUGHTS THAT YOU WOULD BE BETTER OFF DEAD, OR OF HURTING YOURSELF: SEVERAL DAYS
4. FEELING TIRED OR HAVING LITTLE ENERGY: NEARLY EVERY DAY
6. FEELING BAD ABOUT YOURSELF - OR THAT YOU ARE A FAILURE OR HAVE LET YOURSELF OR YOUR FAMILY DOWN: MORE THAN HALF THE DAYS
3. TROUBLE FALLING OR STAYING ASLEEP: SEVERAL DAYS
5. POOR APPETITE OR OVEREATING: MORE THAN HALF THE DAYS
8. MOVING OR SPEAKING SO SLOWLY THAT OTHER PEOPLE COULD HAVE NOTICED. OR THE OPPOSITE - BEING SO FIDGETY OR RESTLESS THAT YOU HAVE BEEN MOVING AROUND A LOT MORE THAN USUAL: NOT AT ALL
2. FEELING DOWN, DEPRESSED OR HOPELESS: MORE THAN HALF THE DAYS
3. TROUBLE FALLING OR STAYING ASLEEP OR SLEEPING TOO MUCH: SEVERAL DAYS
5. POOR APPETITE OR OVEREATING: MORE THAN HALF THE DAYS
4. FEELING TIRED OR HAVING LITTLE ENERGY: NEARLY EVERY DAY
7. TROUBLE CONCENTRATING ON THINGS, SUCH AS READING THE NEWSPAPER OR WATCHING TELEVISION: SEVERAL DAYS

## 2025-05-23 ASSESSMENT — ANXIETY QUESTIONNAIRES
4. TROUBLE RELAXING: SEVERAL DAYS
2. NOT BEING ABLE TO STOP OR CONTROL WORRYING: SEVERAL DAYS
5. BEING SO RESTLESS THAT IT IS HARD TO SIT STILL: NOT AT ALL
1. FEELING NERVOUS, ANXIOUS, OR ON EDGE: MORE THAN HALF THE DAYS
6. BECOMING EASILY ANNOYED OR IRRITABLE: NEARLY EVERY DAY
7. FEELING AFRAID AS IF SOMETHING AWFUL MIGHT HAPPEN: SEVERAL DAYS
2. NOT BEING ABLE TO STOP OR CONTROL WORRYING: SEVERAL DAYS
3. WORRYING TOO MUCH ABOUT DIFFERENT THINGS: SEVERAL DAYS
GAD7 TOTAL SCORE: 9
7. FEELING AFRAID AS IF SOMETHING AWFUL MIGHT HAPPEN: SEVERAL DAYS
IF YOU CHECKED OFF ANY PROBLEMS ON THIS QUESTIONNAIRE, HOW DIFFICULT HAVE THESE PROBLEMS MADE IT FOR YOU TO DO YOUR WORK, TAKE CARE OF THINGS AT HOME, OR GET ALONG WITH OTHER PEOPLE: VERY DIFFICULT
6. BECOMING EASILY ANNOYED OR IRRITABLE: NEARLY EVERY DAY
5. BEING SO RESTLESS THAT IT IS HARD TO SIT STILL: NOT AT ALL
IF YOU CHECKED OFF ANY PROBLEMS ON THIS QUESTIONNAIRE, HOW DIFFICULT HAVE THESE PROBLEMS MADE IT FOR YOU TO DO YOUR WORK, TAKE CARE OF THINGS AT HOME, OR GET ALONG WITH OTHER PEOPLE: VERY DIFFICULT
4. TROUBLE RELAXING: SEVERAL DAYS
3. WORRYING TOO MUCH ABOUT DIFFERENT THINGS: SEVERAL DAYS
1. FEELING NERVOUS, ANXIOUS, OR ON EDGE: MORE THAN HALF THE DAYS

## 2025-05-23 NOTE — PATIENT INSTRUCTIONS
Follow-Up Instructions:  Consult with the gastroenterologist regarding potential dietary modifications and the possibility of meeting with a dietitian.  Continue weekly counseling sessions for depression.  Find a new optometrist for eye strain headaches.  Maintain a consistent sleep schedule.  Gradually incorporate healthy foods into the diet.  Engage in regular physical activity such as walking.  Wear a helmet while biking and avoid swimming alone.  Consider physical therapy if back pain persists.  Schedule a follow-up visit after the colonoscopy.

## 2025-05-23 NOTE — PROGRESS NOTES
Subjective   July is a 25 y.o. adult who presents today for  Health Maintenance and Supervision Exam.    General Health:    History of Present Illness  Patient presents for a routine visit.  She has remained on fluoxetine 40 mg daily for anxiety and depression.  She regularly sees a counselor, and although stress levels have been increased, she would like to remain on the current dose of fluoxetine since she recently graduated from college, and does not have the academic stressors.  She will be moving to [place] for work.      Nutrition/Diet: She attempts to include fruits and vegetables in her diet daily and has reduced her dairy intake. She consumes spinach regularly.    Sleep: Her sleep varies, averaging 6 hours per night, but can range from 3 to 4 hours on difficult nights to 12 hours on others. She admits to using sleep as a coping mechanism. She reports no snoring.    Dental Health: She maintains regular dental appointments.    Activities/Interests: She engages in walking for exercise.    Sexual Orientation/Gender Identity: She is in a stable relationship with her girlfriend.    Safety Practices: She always wears her seatbelt while driving.    Social Determinants of Health (SDOH): She consumes alcohol socially, typically a glass of wine, and avoids excessive drinking due to potential interactions with fluoxetine.    Vision & Hearing: She reports occasional eye strain headaches and needs to find a new optometrist as her previous one has relocated.    Gynecological History: No, she is not currently menstruating because she has an IUD.    July has a history of ulcerative colitis.  She is currently on a regimen of mesalamine, fluoxetine 40 mg, prednisone, and continues to use ketaconazole shampoo. Additionally, she takes vitamin D supplements, multivitamins, and iron supplements. She reports persistent lower abdominal and back pain. She has not identified any specific food triggers for her symptoms, although she  admits to inconsistent dietary habits. She has been avoiding eggs and has not attempted any elimination diets such as FODMAP. She recently consulted her gastroenterologist after a 3-year gap, and a colonoscopy is scheduled in a month. The plan is to continue prednisone until the colonoscopy. She was initially prescribed prednisone due to bleeding and pain, but a reduction in dosage exacerbated her back pain. A temporary increase in prednisone dosage provided some relief.    Her stress levels have decreased since completing her education, but she acknowledges that her stress and anxiety contribute to her depression. She reports no suicidal ideation or self-harm thoughts. She experienced a severe depressive episode 4 months ago, which improved with professional help. She continues to see a counselor weekly and prefers to maintain her fluoxetine dosage at 40 mg.  PHQ-9 score of 13  ASQ score of 9  She continues to experience teeth clenching and TMJ, but these symptoms have lessened. She finds relaxation techniques before bed helpful and does not wake up with these symptoms. She does not use any substances for relaxation.    She occasionally experiences wrist pain, which was particularly bothersome in her left wrist 1 to 2 weeks ago but has since resolved. She attributes this to prolonged computer use. She reports no finger tingling, ankle, knee, or hip pain. Her back pain is not consistently movement-related but can be triggered by bending over or standing up. The pain originates at the joint where her pelvis meets her spine and radiates upwards. She is not currently seeing a physical therapist.                Objective   Physical Exam  Vitals reviewed.   Constitutional:       Appearance: Normal appearance.   HENT:      Head: Normocephalic.      Right Ear: Tympanic membrane normal.      Left Ear: Tympanic membrane normal.      Nose: Nose normal.      Mouth/Throat:      Mouth: Mucous membranes are moist.      Pharynx:  Oropharynx is clear.   Eyes:      Extraocular Movements: Extraocular movements intact.      Conjunctiva/sclera: Conjunctivae normal.      Pupils: Pupils are equal, round, and reactive to light.   Cardiovascular:      Rate and Rhythm: Normal rate and regular rhythm.      Heart sounds: No murmur heard.  Pulmonary:      Effort: Pulmonary effort is normal.      Breath sounds: Normal breath sounds.   Abdominal:      General: Abdomen is flat.      Palpations: Abdomen is soft.   Musculoskeletal:         General: Normal range of motion.      Cervical back: Normal range of motion and neck supple.      Comments: Mild pain on palpation over SI joints bilaterally.    Skin:     General: Skin is warm and dry.   Neurological:      General: No focal deficit present.      Mental Status: July is alert.   Psychiatric:         Mood and Affect: Mood normal.         Assessment/Plan   Healthy 25 y.o. adult child.  1. Anticipatory guidance discussed.  Safety topics reviewed.  2. Diagnoses and all orders for this visit:  Well exam without abnormal findings of patient 18 years of age or older  Encounter for immunization  -     Tdap vaccine, age 7 years and older  Anxiety and depression  -     FLUoxetine (PROzac) 40 mg capsule; Take 1 capsule (40 mg) by mouth once daily.     3. Follow-up visit in 1 year for next well child visit, or sooner as needed.   Assessment & Plan  1. Abdominal pain.  The abdominal pain may be associated with underlying inflammation from UC in addition to  irritable bowel syndrome. She is currently taking mesalamine and prednisone. A colonoscopy is scheduled in a month. She will continue prednisone until the colonoscopy. She is advised to consult with her gastroenterologist regarding potential dietary modifications and the possibility of meeting with a dietitian.    2. Depression.  Her depression appears to be exacerbated by stress and anxiety. She is currently taking fluoxetine 40 mg daily. She reports no thoughts of  self-harm or suicide. She is encouraged to engage in enjoyable activities and maintain a positive outlook. She will continue her counseling sessions once a week. If her symptoms persist or worsen, an increase in fluoxetine dosage will be considered.    3. Headaches.  She experiences occasional eye strain headaches but no debilitating headaches. She needs to find a new optometrist as her previous one has moved.    4. Temporomandibular joint disorder.  She reports occasional teeth clenching and TMJ symptoms, which have improved with relaxation techniques before bed.    5. Sleep disturbances.  She reports variable sleep patterns, averaging 6 hours per night but sometimes sleeping as little as 3-4 hours or as much as 12 hours. She is advised to maintain a consistent sleep schedule and avoid using sleep to avoid daily activities.    6. Health maintenance.  She is advised to gradually incorporate healthy foods into her diet and engage in regular physical activity such as walking. Safety measures such as wearing a helmet while biking and avoiding swimming alone were discussed. A tetanus vaccine will be administered today.    7. Back pain.  The back pain may be related to inflammation in the joint where the pelvis meets the spine, potentially associated with her bowel condition. She is advised to consider physical therapy if the back pain persists.   This medical note was created with the assistance of artificial intelligence (AI) for documentation purposes. The content has been reviewed and confirmed by the healthcare provider for accuracy and completeness. Patient consented to the use of audio recording and use of AI during their visit.

## 2025-06-13 ENCOUNTER — HOSPITAL ENCOUNTER (OUTPATIENT)
Dept: GASTROENTEROLOGY | Facility: HOSPITAL | Age: 25
Discharge: HOME | End: 2025-06-13
Payer: COMMERCIAL

## 2025-06-13 ENCOUNTER — ANESTHESIA (OUTPATIENT)
Dept: GASTROENTEROLOGY | Facility: HOSPITAL | Age: 25
End: 2025-06-13
Payer: COMMERCIAL

## 2025-06-13 ENCOUNTER — ANESTHESIA EVENT (OUTPATIENT)
Dept: GASTROENTEROLOGY | Facility: HOSPITAL | Age: 25
End: 2025-06-13
Payer: COMMERCIAL

## 2025-06-13 VITALS
HEART RATE: 72 BPM | WEIGHT: 190 LBS | DIASTOLIC BLOOD PRESSURE: 64 MMHG | OXYGEN SATURATION: 97 % | SYSTOLIC BLOOD PRESSURE: 98 MMHG | BODY MASS INDEX: 32.44 KG/M2 | TEMPERATURE: 97.2 F | HEIGHT: 64 IN | RESPIRATION RATE: 16 BRPM

## 2025-06-13 DIAGNOSIS — K51.311 ULCERATIVE RECTOSIGMOIDITIS WITH RECTAL BLEEDING (MULTI): ICD-10-CM

## 2025-06-13 PROCEDURE — 2500000004 HC RX 250 GENERAL PHARMACY W/ HCPCS (ALT 636 FOR OP/ED)

## 2025-06-13 PROCEDURE — 3700000002 HC GENERAL ANESTHESIA TIME - EACH INCREMENTAL 1 MINUTE

## 2025-06-13 PROCEDURE — 7100000009 HC PHASE TWO TIME - INITIAL BASE CHARGE

## 2025-06-13 PROCEDURE — 7100000010 HC PHASE TWO TIME - EACH INCREMENTAL 1 MINUTE

## 2025-06-13 PROCEDURE — 45380 COLONOSCOPY AND BIOPSY: CPT | Performed by: INTERNAL MEDICINE

## 2025-06-13 PROCEDURE — 3700000001 HC GENERAL ANESTHESIA TIME - INITIAL BASE CHARGE

## 2025-06-13 RX ORDER — MIDAZOLAM HYDROCHLORIDE 1 MG/ML
INJECTION INTRAMUSCULAR; INTRAVENOUS AS NEEDED
Status: DISCONTINUED | OUTPATIENT
Start: 2025-06-13 | End: 2025-06-13

## 2025-06-13 RX ORDER — FENTANYL CITRATE 50 UG/ML
INJECTION, SOLUTION INTRAMUSCULAR; INTRAVENOUS AS NEEDED
Status: DISCONTINUED | OUTPATIENT
Start: 2025-06-13 | End: 2025-06-13

## 2025-06-13 RX ORDER — PROPOFOL 10 MG/ML
INJECTION, EMULSION INTRAVENOUS AS NEEDED
Status: DISCONTINUED | OUTPATIENT
Start: 2025-06-13 | End: 2025-06-13

## 2025-06-13 RX ORDER — ONDANSETRON HYDROCHLORIDE 2 MG/ML
4 INJECTION, SOLUTION INTRAVENOUS ONCE AS NEEDED
Status: DISCONTINUED | OUTPATIENT
Start: 2025-06-13 | End: 2025-06-14 | Stop reason: HOSPADM

## 2025-06-13 RX ORDER — PHENYLEPHRINE HYDROCHLORIDE 10 MG/ML
INJECTION INTRAVENOUS AS NEEDED
Status: DISCONTINUED | OUTPATIENT
Start: 2025-06-13 | End: 2025-06-13

## 2025-06-13 RX ADMIN — FENTANYL CITRATE 25 MCG: 50 INJECTION, SOLUTION INTRAMUSCULAR; INTRAVENOUS at 10:33

## 2025-06-13 RX ADMIN — PHENYLEPHRINE HYDROCHLORIDE 120 MCG: 10 INJECTION INTRAVENOUS at 10:45

## 2025-06-13 RX ADMIN — PHENYLEPHRINE HYDROCHLORIDE 80 MCG: 10 INJECTION INTRAVENOUS at 10:32

## 2025-06-13 RX ADMIN — PROPOFOL 20 MG: 10 INJECTION, EMULSION INTRAVENOUS at 10:26

## 2025-06-13 RX ADMIN — PHENYLEPHRINE HYDROCHLORIDE 80 MCG: 10 INJECTION INTRAVENOUS at 10:28

## 2025-06-13 RX ADMIN — PROPOFOL 200 MCG/KG/MIN: 10 INJECTION, EMULSION INTRAVENOUS at 10:25

## 2025-06-13 RX ADMIN — PROPOFOL 20 MG: 10 INJECTION, EMULSION INTRAVENOUS at 10:27

## 2025-06-13 RX ADMIN — MIDAZOLAM HYDROCHLORIDE 2 MG: 2 INJECTION, SOLUTION INTRAMUSCULAR; INTRAVENOUS at 10:18

## 2025-06-13 RX ADMIN — PROPOFOL 20 MG: 10 INJECTION, EMULSION INTRAVENOUS at 10:32

## 2025-06-13 RX ADMIN — SODIUM CHLORIDE, SODIUM LACTATE, POTASSIUM CHLORIDE, AND CALCIUM CHLORIDE: 600; 310; 30; 20 INJECTION, SOLUTION INTRAVENOUS at 10:19

## 2025-06-13 RX ADMIN — PROPOFOL 50 MG: 10 INJECTION, EMULSION INTRAVENOUS at 10:24

## 2025-06-13 ASSESSMENT — PAIN SCALES - GENERAL
PAINLEVEL_OUTOF10: 0 - NO PAIN
PAIN_LEVEL: 0
PAINLEVEL_OUTOF10: 0 - NO PAIN

## 2025-06-13 ASSESSMENT — COLUMBIA-SUICIDE SEVERITY RATING SCALE - C-SSRS
2. HAVE YOU ACTUALLY HAD ANY THOUGHTS OF KILLING YOURSELF?: YES
6. HAVE YOU EVER DONE ANYTHING, STARTED TO DO ANYTHING, OR PREPARED TO DO ANYTHING TO END YOUR LIFE?: NO
5. HAVE YOU STARTED TO WORK OUT OR WORKED OUT THE DETAILS OF HOW TO KILL YOURSELF? DO YOU INTEND TO CARRY OUT THIS PLAN?: NO
4. HAVE YOU HAD THESE THOUGHTS AND HAD SOME INTENTION OF ACTING ON THEM?: NO
1. IN THE PAST MONTH, HAVE YOU WISHED YOU WERE DEAD OR WISHED YOU COULD GO TO SLEEP AND NOT WAKE UP?: NO

## 2025-06-13 ASSESSMENT — PAIN - FUNCTIONAL ASSESSMENT
PAIN_FUNCTIONAL_ASSESSMENT: 0-10

## 2025-06-13 NOTE — ANESTHESIA POSTPROCEDURE EVALUATION
Patient: July Harris    Procedure Summary       Date: 06/13/25 Room / Location: [facility]    Anesthesia Start: 1018 Anesthesia Stop: 1103    Procedure: COLONOSCOPY Diagnosis: Ulcerative rectosigmoiditis with rectal bleeding (Multi)    Scheduled Providers: Yakov Todd MD; Earlene Abreu RN; Terry Love DO Responsible Provider: Terry Love DO    Anesthesia Type: MAC ASA Status: 3            Anesthesia Type: MAC    Vitals Value Taken Time   BP 98/65 06/13/25 11:03   Temp 36.9 06/13/25 11:03   Pulse 73 06/13/25 11:03   Resp 19 06/13/25 11:03   SpO2 99 06/13/25 11:03       Anesthesia Post Evaluation    Patient location during evaluation: PACU  Patient participation: complete - patient participated  Level of consciousness: awake  Pain score: 0  Pain management: adequate  Airway patency: patent  Cardiovascular status: acceptable  Respiratory status: acceptable  Hydration status: acceptable  Postoperative Nausea and Vomiting: none        There were no known notable events for this encounter.

## 2025-06-13 NOTE — H&P
"History Of Present Illness  July Harris is a 25 y.o. adult presenting with ulcerative colitis.     Past Medical History  Medical History[1]  Surgical History  Surgical History[2]  Social History  July reports that July has never smoked. July has never used smokeless tobacco. July reports current alcohol use of about 3.0 standard drinks of alcohol per week. July reports that July does not use drugs.    Family History  Family History[3]     Allergies  Allergies[4]  Review of Systems     Physical Exam  Vitals and nursing note reviewed.   Constitutional:       Appearance: Normal appearance.   Neurological:      Mental Status: July is alert.          Last Recorded Vitals  Blood pressure 110/78, pulse 87, temperature 36.3 °C (97.3 °F), temperature source Temporal, resp. rate 16, height 1.626 m (5' 4\"), weight 86.2 kg (190 lb), SpO2 99%.    Assessment/Plan   UC for colonoscopy     Yakov Todd MD       [1]   Past Medical History:  Diagnosis Date    Anxiety     Depression     Influenza due to other identified influenza virus with other respiratory manifestations 03/20/2017    Influenza B    Irregular menstrual cycle 01/03/2024    Local infection of the skin and subcutaneous tissue, unspecified 07/19/2016    Bacterial skin infection    Other conditions influencing health status 07/31/2019    History of chronic diarrhea    Other seborrheic dermatitis 03/13/2023    Personal history of other diseases of the digestive system 07/31/2019    History of constipation    Personal history of other diseases of the female genital tract 08/26/2015    History of dysmenorrhea    Personal history of other diseases of the respiratory system 05/08/2015    History of streptococcal pharyngitis    Personal history of other diseases of the respiratory system 11/02/2016    History of pharyngitis    Personal history of other diseases of the respiratory system 11/02/2016    History of streptococcal pharyngitis    Personal history of " other specified conditions 03/20/2017    History of fever    Poison ivy 01/03/2024    Seborrheic dermatitis, unspecified 01/02/2019    Seborrheic dermatitis of scalp    Seborrheic dermatitis, unspecified 03/13/2023   [2] History reviewed. No pertinent surgical history.  [3] No family history on file.  [4]   Allergies  Allergen Reactions    Sulfa (Sulfonamide Antibiotics) Rash and Hives

## 2025-06-13 NOTE — DISCHARGE INSTRUCTIONS
Patient Instructions after a Colonoscopy      The anesthetics, sedatives or narcotics which were given to you today will be acting in your body for the next 24 hours, so you might feel a little sleepy or groggy.  This feeling should slowly wear off. Carefully read and follow the instructions.     You received sedation today:  - Do not drive or operate any machinery or power tools of any kind.   - No alcoholic beverages today, not even beer or wine.  - Do not make any important decisions or sign any legal documents.  - No over the counter medications that contain alcohol or that may cause drowsiness.  - Do not make any important decisions or sign any legal documents.  - Make sure you have someone with you for first 24 hours.    While it is common to experience mild to moderate abdominal distention, gas, or belching after your procedure, if any of these symptoms occur following discharge from the GI Lab or within one week of having your procedure, call the [facility] to be advised whether a visit to your nearest Urgent Care or Emergency Department is indicated.  Take this paper with you if you go.     - If you develop an allergic reaction to the medications that were given during your procedure such as difficulty breathing, rash, hives, severe nausea, vomiting or lightheadedness.  - If you experience chest pain, shortness of breath, severe abdominal pain, fevers and chills.  -If you develop signs and symptoms of bleeding such as blood in your spit, if your stools turn black, tarry, or bloody  - If you have not urinated within 8 hours following your procedure.  - If your IV site becomes painful, red, inflamed, or looks infected.    If you received a biopsy/polypectomy/sphincterotomy the following instructions apply below:    __ Do not use Aspirin containing products, non-steroidal medications or anti-coagulants for one week following your procedure. (Examples of these types of medications are: Advil,  Arthrotec, Aleve, Coumadin, Ecotrin, Heparin, Ibuprofen, Indocin, Motrin, Naprosyn, Nuprin, Plavix, Vioxx, and Voltarin, or their generic forms.  This list is not all-inclusive.  Check with your physician or pharmacist before resuming medications.)   __ Eat a soft diet today.  Avoid foods that are poorly digested for the next 24 hours.  These foods would include: nuts, beans, lettuce, red meats, and fried foods. Start with liquids and advance your diet as tolerated, gradually work up to eating solids.   __ Do not have a Barium Study or Enema for one week.    Your physician recommends the additional following instructions:    -You have a contact number available for emergencies. The signs and symptoms of potential delayed complications were discussed with you. You may return to normal activities tomorrow.  -Resume your previous diet.  -Continue your present medications.   -We are waiting for your pathology results.  -Your physician has recommended a repeat colonoscopy (date to be determined after pending pathology results are reviewed) for surveillance based on pathology results.  -The findings and recommendations have been discussed with you.  -The findings and recommendations were discussed with your family.  - Please see Medication Reconciliation Form for new medication/medications prescribed.       If you experience any problems or have any questions following discharge from the GI Lab, please call:    Yakov Todd MD  [phone]    To reach your physician after hours call [phone] and ask for the GI Fellow on call      Nurse Signature                                                                        Date___________________                                                                            Patient/Responsible Party Signature                                        Date___________________

## 2025-06-13 NOTE — ANESTHESIA PREPROCEDURE EVALUATION
Patient: July Harris    Procedure Information       Date/Time: 06/13/25 1000    Scheduled providers: Yakov Todd MD; Earlene Abreu RN; Terry Love DO    Procedure: COLONOSCOPY    Location: [facility]            Relevant Problems   Anesthesia (within normal limits)      Neuro   (+) Anxiety   (+) Depression      GI   (+) Ulcerative proctitis (Multi)       Clinical information reviewed:   Tobacco  Allergies  Meds   Med Hx  Surg Hx   Fam Hx  Soc Hx        NPO Detail:  NPO/Void Status  Date of Last Liquid: 06/12/25  Time of Last Liquid: 0515  Date of Last Solid: 06/12/25  Last Intake Type: Food         Physical Exam    Airway  Mallampati: I  TM distance: >3 FB  Neck ROM: full  Mouth opening: 3 or more finger widths     Cardiovascular - normal exam   Dental - normal exam     Pulmonary - normal examBreath sounds clear to auscultation     Abdominal - normal exam           Anesthesia Plan    History of general anesthesia?: yes  History of complications of general anesthesia?: no    ASA 3     MAC     intravenous induction   Anesthetic plan and risks discussed with patient.    Plan discussed with CAA.

## 2025-06-23 LAB
LABORATORY COMMENT REPORT: NORMAL
PATH REPORT.FINAL DX SPEC: NORMAL
PATH REPORT.GROSS SPEC: NORMAL
PATH REPORT.TOTAL CANCER: NORMAL

## 2026-05-26 ENCOUNTER — APPOINTMENT (OUTPATIENT)
Dept: PEDIATRICS | Facility: CLINIC | Age: 26
End: 2026-05-26
Payer: COMMERCIAL